# Patient Record
Sex: FEMALE | Race: WHITE | NOT HISPANIC OR LATINO | Employment: FULL TIME | URBAN - METROPOLITAN AREA
[De-identification: names, ages, dates, MRNs, and addresses within clinical notes are randomized per-mention and may not be internally consistent; named-entity substitution may affect disease eponyms.]

---

## 2023-08-02 ENCOUNTER — HOSPITAL ENCOUNTER (OUTPATIENT)
Dept: DATA CONVERSION | Facility: HOSPITAL | Age: 53
End: 2023-08-02
Attending: INTERNAL MEDICINE | Admitting: INTERNAL MEDICINE
Payer: COMMERCIAL

## 2023-08-02 DIAGNOSIS — Z12.11 ENCOUNTER FOR SCREENING FOR MALIGNANT NEOPLASM OF COLON: ICD-10-CM

## 2023-08-02 DIAGNOSIS — K62.89 OTHER SPECIFIED DISEASES OF ANUS AND RECTUM: ICD-10-CM

## 2023-09-29 VITALS — WEIGHT: 146.39 LBS | HEIGHT: 63 IN | BODY MASS INDEX: 25.94 KG/M2

## 2023-09-30 NOTE — H&P
History of Present Illness:   Pregnant/Lactating:  ·  Are You Pregnant no   ·  Are You Currently Breastfeeding no     Admission Reason: screening colonoscopy   HPI:    JIMENA PARKER is a 52 year old Female           Allergies:  ·  sulfa drugs : Unknown    Medications Prior to Admission:   Admission Medication Reconciliation has not been completed for this patient.    Objective:   Physical Exam by System:    Constitutional: Well developed, awake/alert/oriented  x3, no distress, alert and cooperative   Eyes: PERRL, EOMI, clear sclera   ENMT: mucous membranes moist, no apparent injury,  no lesions seen   Head/Neck: Neck supple, no apparent injury, thyroid  without mass or tenderness, No JVD, trachea midline, no bruits   Respiratory/Thorax: Patent airways, CTAB, normal  breath sounds with good chest expansion, thorax symmetric   Cardiovascular: Regular, rate and rhythm, no murmurs,  2+ equal pulses of the extremities, normal S 1and S 2   Gastrointestinal: Nondistended, soft, non-tender,  no rebound tenderness or guarding, no masses palpable, no organomegaly, +BS, no bruits   Genitourinary: No Discharge, vesicles or other abnormalities   Musculoskeletal: ROM intact, no joint swelling, normal  strength   Extremities: normal extremities, no cyanosis edema,  contusions or wounds, no clubbing   Neurological: alert and oriented x3, intact senses,  motor, response and reflexes, normal strength   Skin: Warm and dry, no lesions, no rashes       Electronic Signatures:  Benito Fernandez)  (Signed 02-Aug-2023 08:12)   Authored: History of Present Illness, Comorbidities,  Allergies, Medications Prior to Admission, Objective, Note Completion      Last Updated: 02-Aug-2023 08:12 by Benito Fernandez)

## 2023-11-14 ENCOUNTER — OFFICE VISIT (OUTPATIENT)
Dept: PRIMARY CARE | Facility: CLINIC | Age: 53
End: 2023-11-14
Payer: COMMERCIAL

## 2023-11-14 VITALS
WEIGHT: 151 LBS | SYSTOLIC BLOOD PRESSURE: 130 MMHG | DIASTOLIC BLOOD PRESSURE: 80 MMHG | TEMPERATURE: 97.4 F | BODY MASS INDEX: 26.82 KG/M2

## 2023-11-14 DIAGNOSIS — M79.645 FINGER PAIN, LEFT: Primary | ICD-10-CM

## 2023-11-14 LAB — ERYTHROCYTE [SEDIMENTATION RATE] IN BLOOD BY WESTERGREN METHOD: 4 MM/H (ref 0–30)

## 2023-11-14 PROCEDURE — 86038 ANTINUCLEAR ANTIBODIES: CPT

## 2023-11-14 PROCEDURE — 1036F TOBACCO NON-USER: CPT | Performed by: FAMILY MEDICINE

## 2023-11-14 PROCEDURE — 99213 OFFICE O/P EST LOW 20 MIN: CPT | Performed by: FAMILY MEDICINE

## 2023-11-14 PROCEDURE — 86431 RHEUMATOID FACTOR QUANT: CPT

## 2023-11-14 PROCEDURE — 36415 COLL VENOUS BLD VENIPUNCTURE: CPT

## 2023-11-14 PROCEDURE — 85652 RBC SED RATE AUTOMATED: CPT

## 2023-11-14 RX ORDER — MELOXICAM 15 MG/1
15 TABLET ORAL DAILY
Qty: 30 TABLET | Refills: 0 | Status: SHIPPED | OUTPATIENT
Start: 2023-11-14 | End: 2024-11-13

## 2023-11-14 RX ORDER — TAZAROTENE 0.45 MG/G
LOTION TOPICAL
COMMUNITY
Start: 2021-09-08

## 2023-11-14 RX ORDER — NORETHINDRONE 0.35 MG/1
TABLET ORAL
COMMUNITY

## 2023-11-14 RX ORDER — SPIRONOLACTONE 50 MG/1
50 TABLET, FILM COATED ORAL DAILY
COMMUNITY

## 2023-11-14 ASSESSMENT — PATIENT HEALTH QUESTIONNAIRE - PHQ9
1. LITTLE INTEREST OR PLEASURE IN DOING THINGS: NOT AT ALL
2. FEELING DOWN, DEPRESSED OR HOPELESS: NOT AT ALL
SUM OF ALL RESPONSES TO PHQ9 QUESTIONS 1 AND 2: 0

## 2023-11-14 ASSESSMENT — ENCOUNTER SYMPTOMS: DEPRESSION: 0

## 2023-11-14 NOTE — PROGRESS NOTES
"Subjective   Patient ID: [id number]     Jia Yee is a 53 y.o. female who presents for Hand Pain (Left pinky and left ring finger.).  HPI  She complains of pain in her left fourth and fifth finger.      It has been there for a couple of years.      She types and plays piano and it is getting in the way of her doing these things lately.    She has tried tumeric.      No neck pain.  She has pain in the right cubital tunnel with lifting weights sometimes.  Her finger pain is in the left.      The pain is more in the DIP joints of the left fourth and fifth  finger.    Fam Hx.  her dad had \"trigger finger\".  Her sister has RA.          Objective     /80 (BP Location: Right arm, Patient Position: Sitting)   Temp 36.3 °C (97.4 °F) (Skin)   Wt 68.5 kg (151 lb)   BMI 26.82 kg/m²      Physical Exam  Constitutional:       Appearance: Normal appearance.   Musculoskeletal:      Comments: Tender at the left DIP joints of fourth and fifth fingers.  Hypertrophic changed noted.  Full ROM but tender.        Neurological:      Mental Status: She is alert.         Assessment/Plan   Problem List Items Addressed This Visit    None  Visit Diagnoses       Finger pain, left    -  Primary    Relevant Medications    meloxicam (Mobic) 15 mg tablet    Other Relevant Orders    XR hand left 3+ views    Rheumatoid Factor    JUDIE with Reflex to OUMAR    Sedimentation Rate    Referral to Occupational Therapy        I will order xrays, labs, a medication and occupational therapy.  Return in one or two months if the pain persists.    Zaid Nielsen, DO   "

## 2023-11-14 NOTE — PATIENT INSTRUCTIONS
I will order xrays, labs, a medication and occupational therapy.  Return in one or two months if the pain persists.

## 2023-11-15 ENCOUNTER — ANCILLARY PROCEDURE (OUTPATIENT)
Dept: RADIOLOGY | Facility: CLINIC | Age: 53
End: 2023-11-15
Payer: COMMERCIAL

## 2023-11-15 DIAGNOSIS — M79.645 FINGER PAIN, LEFT: ICD-10-CM

## 2023-11-15 LAB
ANA SER QL HEP2 SUBST: NEGATIVE
RHEUMATOID FACT SER NEPH-ACNC: <10 IU/ML (ref 0–15)

## 2023-11-15 PROCEDURE — 73130 X-RAY EXAM OF HAND: CPT | Mod: LEFT SIDE | Performed by: RADIOLOGY

## 2023-11-15 PROCEDURE — 73130 X-RAY EXAM OF HAND: CPT | Mod: LT

## 2023-12-11 ENCOUNTER — EVALUATION (OUTPATIENT)
Dept: OCCUPATIONAL THERAPY | Facility: CLINIC | Age: 53
End: 2023-12-11
Payer: COMMERCIAL

## 2023-12-11 DIAGNOSIS — M79.645 FINGER PAIN, LEFT: ICD-10-CM

## 2023-12-11 PROCEDURE — 97110 THERAPEUTIC EXERCISES: CPT | Mod: GO

## 2023-12-11 PROCEDURE — 97165 OT EVAL LOW COMPLEX 30 MIN: CPT | Mod: GO

## 2023-12-11 NOTE — PROGRESS NOTES
Occupational Therapy Upper Extremity Evaluation/Discharge Note    12/11/2023  Time in: 1511  Time out:1538  Total Time:27    Name: Jia Yee   YOB: 1970   MRN: [id number]    Referring Physician: Dr. Nielsen    for: left ring and small finger pain  Diagnosis: Left ring and small finger pain   Date of onset:6/3/2021  Precautions: None    Insurance  Visit Number:1  Visits Allowed: 60  Authorization: None required  Date Range: NA    Subjective  Function: Patient states she has pain with playing piano, typing, and carrying weight in her hand.  Pain and Location:)/10 rest, 2/20 playing piano  Chief Complaint: Pain in IPs  Patient's goal for therapy: Pain free when working out and playing the piano  Outcome Measure:  Initial evaluation Quick DASH    Objective  Observation:  Mild edema small and ring DIP  Skin/ Wound/Scar: skin intact    Sensation: No complaint of numbness/tingling  Dexterity/ Coordination: No issues with fine motor, per patient    Upper Extremity ROM   Elbow flex/extension WNL  Forearm supination/pronation WNL  Wrist Extension/Flexion WNL  Radial deviation/ulnar deviation  wWNL    Hand ROM  AROM   THUMB      K[place]andji DPC    Palmar Abduction     Radial Abduction      Finger (DPC)        Index Middle Ring Small    DPC DPC DPC DPC                Hand Strength                Gross grasp(dynamometer) (lbs)                             (2nd setting) Right   55   Left 60                Pinch (lbs)                             Key  right 14  left 12                            Jones   right  16   left 14    Treatment Completed This Date:  Patient instructed in joint protection techniques. She was also instructed in pain management and edema reduction techniques. Patient fitted with compression sleeves for ring and small fingers., instructed in coban wrapping, and use of Rock Tape. Purpose, wearing schedule, precautions, and care discussed. Patient instructed in how to remove rock tape . She also  completed trial paraffin. Patient discharge this date and is expected to do well with education provided to her for pain management, edema reduction, and joint protection.    Assessment  Patient is a 54 y/o right hand dominate female with a history of a couple of years of pain. Patient diagnosed with finger pain left.   Patient reports pain and decreased independence with ADLs/ IADLs. Patient demonstrates mild decreased ability with ADLs/IADLs  per Quick DASH score. Patient will benefit from attending occupational therapy this date for one time visit for in instruction in joint protection and pain management techniques and discharged.     Plan of Care  Goals to be achieved this date      Patient will report understanding of home program and joint protection and demonstrate independence and verbalize precautions.    Intervention    Trial paraffin and patient education    Plan  Discharge this date. Patient instructed to contact therapist with any questions.    Patient and therapist developed goals for therapy and patient verbalizing agreement to plan of care

## 2024-01-24 ENCOUNTER — TELEPHONE (OUTPATIENT)
Dept: PRIMARY CARE | Facility: CLINIC | Age: 54
End: 2024-01-24
Payer: COMMERCIAL

## 2024-01-24 DIAGNOSIS — M19.049 OSTEOARTHRITIS OF FINGER, UNSPECIFIED LATERALITY: Primary | ICD-10-CM

## 2024-01-24 NOTE — TELEPHONE ENCOUNTER
Pt is requesting a referral for a hand specialist. She went to OT and they told her that she has arthritis and they would not be able to help her. She has already completed the x-ray, as well. Please let me know when this is ordered so I can notify pt

## 2024-01-30 ENCOUNTER — OFFICE VISIT (OUTPATIENT)
Dept: ORTHOPEDIC SURGERY | Facility: CLINIC | Age: 54
End: 2024-01-30
Payer: COMMERCIAL

## 2024-01-30 DIAGNOSIS — M19.049 OSTEOARTHRITIS OF FINGER, UNSPECIFIED LATERALITY: ICD-10-CM

## 2024-01-30 PROCEDURE — 1036F TOBACCO NON-USER: CPT | Performed by: ORTHOPAEDIC SURGERY

## 2024-01-30 PROCEDURE — 2500000004 HC RX 250 GENERAL PHARMACY W/ HCPCS (ALT 636 FOR OP/ED): Performed by: ORTHOPAEDIC SURGERY

## 2024-01-30 PROCEDURE — 20600 DRAIN/INJ JOINT/BURSA W/O US: CPT | Mod: LT | Performed by: ORTHOPAEDIC SURGERY

## 2024-01-30 PROCEDURE — 2500000005 HC RX 250 GENERAL PHARMACY W/O HCPCS: Performed by: ORTHOPAEDIC SURGERY

## 2024-01-30 PROCEDURE — 99214 OFFICE O/P EST MOD 30 MIN: CPT | Performed by: ORTHOPAEDIC SURGERY

## 2024-01-30 RX ORDER — LIDOCAINE HYDROCHLORIDE 10 MG/ML
0.5 INJECTION INFILTRATION; PERINEURAL
Status: COMPLETED | OUTPATIENT
Start: 2024-01-30 | End: 2024-01-30

## 2024-01-30 RX ADMIN — LIDOCAINE HYDROCHLORIDE 0.5 ML: 10 INJECTION, SOLUTION INFILTRATION; PERINEURAL at 08:44

## 2024-01-30 RX ADMIN — TRIAMCINOLONE ACETONIDE 5 MG: 10 INJECTION, SUSPENSION INTRA-ARTICULAR; INTRALESIONAL at 08:44

## 2024-01-30 RX ADMIN — TRIAMCINOLONE ACETONIDE 5 MG: 10 INJECTION, SUSPENSION INTRA-ARTICULAR; INTRALESIONAL at 08:45

## 2024-01-30 RX ADMIN — LIDOCAINE HYDROCHLORIDE 0.5 ML: 10 INJECTION, SOLUTION INFILTRATION; PERINEURAL at 08:45

## 2024-01-30 NOTE — PROGRESS NOTES
1/30/2024    Chief Complaint   Patient presents with    Left Hand - Pain, New Patient Visit     Ring and small finger arthritis  Xrays @  11/14/23       History of Present Illness:  Patient Jia Yee , 53 y.o. female, presents today, 1/30/2024, for evaluation of left ring finger and little finger pain and swelling.  Jia comes in today for evaluation of left ring and small finger pain and swelling localized over the distal interphalangeal joints.  She states has been a chronic issue for several months.  Is been steadily worsening.  She denies any discrete injury or trauma.  She feels that the activities are exacerbated by typing all day and playing piano.  She is right-hand dominant individual.  She saw her PCP for this and had a consult with occupational therapy but they recommended follow-up with a hand surgeon for further evaluation.  Of note patient has family history of rheumatoid arthritis with her sister being positive, however recent blood work from PCP showed negative ESR, negative JUDIE, negative rheumatoid factor.       Review of Systems:   GENERAL: Negative  GI: Negative  MUSCULOSKELETAL: See HPI  SKIN: Negative  NEURO:  Negative     Physical Exam:  GENERAL:  Alert and oriented to person, place, and time.  No acute distress and breathing comfortably; pleasant and cooperative with the examination.  HEENT:  Head is normocephalic and atraumatic.  NECK:  Supple, no visible swelling.  CARDIOVASCULAR:  No palpable tachycardia.  LUNGS:  No audible wheezing or labored breathing.  ABDOMEN:  Nondistended.  Extremities: Evaluation of left upper extremity finds the patient to have a palpable radial artery at the wrist with brisk capillary refill to all digits. The patient has intact sensorium to axillary, radial, median and ulnar nerves. There are no open wounds. There are no signs of infection. There is no evidence of lymphedema or lymphatic streaking. The patient has supple compartments of the left  arm, forearm and hand.  Mild tenderness palpation and swelling noted over the left ring finger and left small finger distal interphalangeal joints respectively.  Patient does have full composite fist on exam no extensor lag or rotational deformity noted.     Imaging:  3 views of the left hand in the  PACS system show no acute fracture or dislocation.  There is arthritic change noted with loss of joint space height, narrowing, osteophyte formation to the left ring finger and small finger distal interphalangeal joints.     Assessment:  Left hand arthritis affecting left ring finger and left small finger distal interphalangeal joints.     Plan:  Operative and nonoperative treatment strategies were reviewed.  Recommendations were made for initial nonoperative management in favor of Kenalog injection into left ring finger and left small finger distal interphalangeal joints.  These were performed off today by Dr. Last and tolerated by patient.  Continue with activities to tolerance.  We did discuss the option of intermittent taping and bracing of the fingers as needed for comfort as well as oral anti-inflammatories as needed for pain control.  Follow-up with our office in 6 weeks for repeat clinical exam no radiographs necessary upon return.  All questions answered today's visit.        Hand / UE Inj/Asp: L ring DIP for osteoarthritis on 1/30/2024 8:44 AM  Indications: pain  Details: 25 G needle, dorsal approach  Medications: 5 mg triamcinolone acetonide 10 mg/mL; 0.5 mL lidocaine 10 mg/mL (1 %)  Outcome: tolerated well, no immediate complications  Procedure, treatment alternatives, risks and benefits explained, specific risks discussed. Consent was given by the patient. Immediately prior to procedure a time out was called to verify the correct patient, procedure, equipment, support staff and site/side marked as required. Patient was prepped and draped in the usual sterile fashion.       Hand / UE Inj/Asp: L small  DIP for osteoarthritis on 1/30/2024 8:45 AM  Indications: pain  Details: 25 G needle, dorsal approach  Medications: 5 mg triamcinolone acetonide 10 mg/mL; 0.5 mL lidocaine 10 mg/mL (1 %)  Outcome: tolerated well, no immediate complications  Procedure, treatment alternatives, risks and benefits explained, specific risks discussed. Consent was given by the patient. Immediately prior to procedure a time out was called to verify the correct patient, procedure, equipment, support staff and site/side marked as required. Patient was prepped and draped in the usual sterile fashion.       In a face to face encounter, I performed a history and physical examination, discussed pertinent diagnostic studies if indicated, and discussed diagnosis and management strategies with both the patient and the mid-level provider. I reviewed the mid-level's note and agree with the documented findings and plan of care.      Patient presents today with multiple complaints but primarily wants to focus on symptomatic pain to distal interphalangeal joints of left ring and small fingers.  Exam shows tenderness and swelling to the joints on today's exam.  Treatment options were discussed.  Patient elects for intra-articular steroid injection to the distal interphalangeal joints.  We did talk about operative techniques to include fusion if it came to that.  Plan for 6-week follow-up.  No x-rays necessary upon return.

## 2024-03-14 ENCOUNTER — OFFICE VISIT (OUTPATIENT)
Dept: ORTHOPEDIC SURGERY | Facility: CLINIC | Age: 54
End: 2024-03-14
Payer: COMMERCIAL

## 2024-03-14 DIAGNOSIS — M19.049 OSTEOARTHRITIS OF FINGER, UNSPECIFIED LATERALITY: Primary | ICD-10-CM

## 2024-03-14 PROCEDURE — 1036F TOBACCO NON-USER: CPT | Performed by: ORTHOPAEDIC SURGERY

## 2024-03-14 PROCEDURE — 99213 OFFICE O/P EST LOW 20 MIN: CPT | Performed by: ORTHOPAEDIC SURGERY

## 2024-03-14 RX ORDER — BUSPIRONE HYDROCHLORIDE 10 MG/1
10 TABLET ORAL 3 TIMES DAILY
COMMUNITY
Start: 2017-01-23

## 2024-03-14 ASSESSMENT — PAIN - FUNCTIONAL ASSESSMENT: PAIN_FUNCTIONAL_ASSESSMENT: 0-10

## 2024-03-14 ASSESSMENT — PAIN SCALES - GENERAL: PAINLEVEL_OUTOF10: 2

## 2024-03-14 NOTE — PROGRESS NOTES
3/14/2024    Chief Complaint   Patient presents with    Left Hand - Follow-up     Lt hand arthritis affecting Lt ring and small DIP joint s/p inj 1/30/24       History of Present Illness:  Patient Jia Yee , 53 y.o. female, presents today, 3/14/2024, for evaluation of left ring finger and little finger pain and swelling secondary to DIP arthritis.  She underwent injections about 6 to 7 weeks ago and has had good symptomatic relief.  She feels that swelling is decreased, motion is improved, overall pain is much more well-controlled.  Functionally with work activities as such as typing and then playing the piano at home she is not having pain or discomfort.       Review of Systems:   GENERAL: Negative  GI: Negative  MUSCULOSKELETAL: See HPI  SKIN: Negative  NEURO:  Negative     Physical Exam:  GENERAL:  Alert and oriented to person, place, and time.  No acute distress and breathing comfortably; pleasant and cooperative with the examination.  HEENT:  Head is normocephalic and atraumatic.  NECK:  Supple, no visible swelling.  CARDIOVASCULAR:  No palpable tachycardia.  LUNGS:  No audible wheezing or labored breathing.  ABDOMEN:  Nondistended.  Extremities: Evaluation of left upper extremity finds the patient to have a palpable radial artery at the wrist with brisk capillary refill to all digits. The patient has intact sensorium to axillary, radial, median and ulnar nerves. There are no open wounds. There are no signs of infection. There is no evidence of lymphedema or lymphatic streaking. The patient has supple compartments of the left arm, forearm and hand.  Very mild tenderness palpation over the distal interphalangeal joints to left small and ring finger.  This is substantially improved from previous exam.  She is full composite fist no evidence of extensor lag.     Imaging/Test Results:  None today.     Assessment:  Distal interphalangeal joint arthritis affecting left small and ring fingers, responding  well to nonoperative treatment strategies.     Plan:  Operative and nonoperative treatment strategies were reviewed, patient has strong preference for continued nonoperative management.  Patient can continue with weightbearing and activities as tolerated.  Follow-up with our office in as-needed basis.  We did discuss possibility of repeat injection in the future.  Patient verbalized agreement, understanding, plan for care.  All questions answered at today's visit.      In a face to face encounter, I performed a history and physical examination, discussed pertinent diagnostic studies if indicated, and discussed diagnosis and management strategies with both the patient and the mid-level provider. I reviewed the mid-level's note and agree with the documented findings and plan of care.  Patient presents today for arthritic change affecting the left ring and small finger distal interphalangeal joints.  Steroid injections really helped.  He is not perfect but it is clearly improved.  On today's exam there is minimal tenderness.  Treatment options were discussed.  She understands that likely pain will return over time.  We talked about what can be done surgically if injections fail.  Ultimately plan for follow-up on an as-needed basis and likely would implement steroid injection again given the benefit of the recent injections.

## 2024-06-11 ENCOUNTER — APPOINTMENT (OUTPATIENT)
Dept: ORTHOPEDIC SURGERY | Facility: CLINIC | Age: 54
End: 2024-06-11
Payer: COMMERCIAL

## 2024-07-09 ENCOUNTER — OFFICE VISIT (OUTPATIENT)
Dept: ORTHOPEDIC SURGERY | Facility: CLINIC | Age: 54
End: 2024-07-09
Payer: COMMERCIAL

## 2024-07-09 DIAGNOSIS — M19.049 OSTEOARTHRITIS OF FINGER, UNSPECIFIED LATERALITY: Primary | ICD-10-CM

## 2024-07-09 PROCEDURE — 2500000005 HC RX 250 GENERAL PHARMACY W/O HCPCS: Performed by: ORTHOPAEDIC SURGERY

## 2024-07-09 PROCEDURE — 99214 OFFICE O/P EST MOD 30 MIN: CPT | Performed by: ORTHOPAEDIC SURGERY

## 2024-07-09 PROCEDURE — 1036F TOBACCO NON-USER: CPT | Performed by: ORTHOPAEDIC SURGERY

## 2024-07-09 PROCEDURE — 20600 DRAIN/INJ JOINT/BURSA W/O US: CPT | Mod: LT | Performed by: ORTHOPAEDIC SURGERY

## 2024-07-09 PROCEDURE — 2500000004 HC RX 250 GENERAL PHARMACY W/ HCPCS (ALT 636 FOR OP/ED): Performed by: ORTHOPAEDIC SURGERY

## 2024-07-09 RX ORDER — LIDOCAINE HYDROCHLORIDE 10 MG/ML
0.5 INJECTION INFILTRATION; PERINEURAL
Status: COMPLETED | OUTPATIENT
Start: 2024-07-09 | End: 2024-07-09

## 2024-07-09 NOTE — PROGRESS NOTES
7/9/2024    Chief Complaint   Patient presents with    Left Hand - Osteoarthritis     Lt ring and small DIP arthritis s/p injs 1/30/24       History of Present Illness:  Patient Jia Yee , 53 y.o. female, presents today, 7/9/2024, for evaluation of left little finger pain and swelling.  Symptoms are localized to the distal interphalangeal joint.  She underwent injection of this painful joint in January of this year and got great symptomatic relief for about 5 months.  Symptoms of pain and swelling to the small finger localized to the DIP are starting return.  She denies any new injury or trauma.  She is requesting repeat injection today.        Review of Systems:   GENERAL: Negative  GI: Negative  MUSCULOSKELETAL: See HPI  SKIN: Negative  NEURO:  Negative     Physical Exam:  GENERAL:  Alert and oriented to person, place, and time.  No acute distress and breathing comfortably; pleasant and cooperative with the examination.  HEENT:  Head is normocephalic and atraumatic.  NECK:  Supple, no visible swelling.  CARDIOVASCULAR:  No palpable tachycardia.  LUNGS:  No audible wheezing or labored breathing.  ABDOMEN:  Nondistended.  Extremities: Evaluation of left upper extremity finds the patient to have a palpable radial artery at the wrist with brisk capillary refill to all digits. The patient has intact sensorium to axillary, radial, median and ulnar nerves. There are no open wounds. There are no signs of infection. There is no evidence of lymphedema or lymphatic streaking. The patient has supple compartments of the left arm, forearm and hand.  Tenderness palpation over the distal interphalangeal joint of the left small finger with mild swelling is noted.  She does have full composite fist on exam.  No extensor lag.     Imaging/Test Results:  None today.  Previous radiographs showed arthritic change at the distal interphalangeal joint of the left small finger.     Assessment:  Left small finger distal  interphalangeal joint arthritis.     Plan:  Treatment strategies were reviewed including operative and nonoperative strategies.  Recommendations were made for continued nonoperative management with Kenalog injection of left small finger distal interphalangeal joint.  This is also patient preference.  This was performed in office today by Dr. Last and tolerated well.  Continue with activities as tolerated and follow-up with our office in as-needed basis if symptoms dictate, but we do anticipate return in the future.  All questions answered at today's visit.    Hand / UE Inj/Asp: L small DIP for osteoarthritis on 7/9/2024 3:47 PM  Indications: pain  Details: 25 G needle, dorsal approach  Medications: 5 mg triamcinolone acetonide 10 mg/mL; 0.5 mL lidocaine 10 mg/mL (1 %)  Outcome: tolerated well, no immediate complications  Procedure, treatment alternatives, risks and benefits explained, specific risks discussed. Consent was given by the patient. Immediately prior to procedure a time out was called to verify the correct patient, procedure, equipment, support staff and site/side marked as required. Patient was prepped and draped in the usual sterile fashion.         In a face to face encounter, I performed a history and physical examination, discussed pertinent diagnostic studies if indicated, and discussed diagnosis and management strategies with both the patient and the mid-level provider. I reviewed the mid-level's note and agree with the documented findings and plan of care.  Patient presents today for evaluation of left small finger distal ulna phalangeal joint pain.  Focal tenderness and deformity.  Treatment options were discussed.  We talked about operative and nonoperative strategies.  We talked about the option of fusion along with intraoperative techniques for the fusion.  She elects for repeat steroid injection and follow-up when symptoms dictate.

## 2024-11-19 ENCOUNTER — OFFICE VISIT (OUTPATIENT)
Dept: ORTHOPEDIC SURGERY | Facility: CLINIC | Age: 54
End: 2024-11-19
Payer: COMMERCIAL

## 2024-11-19 DIAGNOSIS — M19.049 HAND ARTHRITIS: Primary | ICD-10-CM

## 2024-11-19 PROCEDURE — 99214 OFFICE O/P EST MOD 30 MIN: CPT | Mod: 25 | Performed by: ORTHOPAEDIC SURGERY

## 2024-11-19 PROCEDURE — 2500000004 HC RX 250 GENERAL PHARMACY W/ HCPCS (ALT 636 FOR OP/ED): Performed by: ORTHOPAEDIC SURGERY

## 2024-11-19 PROCEDURE — 20600 DRAIN/INJ JOINT/BURSA W/O US: CPT | Mod: RT | Performed by: ORTHOPAEDIC SURGERY

## 2024-11-19 PROCEDURE — 99214 OFFICE O/P EST MOD 30 MIN: CPT | Performed by: ORTHOPAEDIC SURGERY

## 2024-11-19 PROCEDURE — 20600 DRAIN/INJ JOINT/BURSA W/O US: CPT | Mod: 59,RT | Performed by: ORTHOPAEDIC SURGERY

## 2024-11-19 RX ORDER — LIDOCAINE HYDROCHLORIDE 10 MG/ML
0.5 INJECTION, SOLUTION INFILTRATION; PERINEURAL
Status: COMPLETED | OUTPATIENT
Start: 2024-11-19 | End: 2024-11-19

## 2024-11-19 NOTE — PROGRESS NOTES
History present illness: Patient presents today for evaluation of painful arthritis affecting left small finger and ring finger at the distal interphalangeal joint.  Pain and dysfunction on a daily basis.      Past medical history: The patient's past medical history, family history, social history, and review of systems were documented on the patient medical intake.  The updated data was reviewed in the electronic medical record.  History is negative except otherwise stated in history of present illness.        Physical examination:  General: Alert and oriented to person, place, and time.  No acute distress and breathing comfortably: Pleasant and cooperative with examination.  HEENT: Head is normocephalic and atraumatic.  Neck: Supple, no visible swelling.  Cardiovascular: No palpable tachycardia  Lungs: No audible wheezing or labored breathing  Abdomen: Nondistended.  Extremities: Evaluation of the left upper extremity finds the patient had palpable radial artery at the wrist with brisk capillary refill to all digits.  Patient has intact sensation to axillary radial median and ulnar nerves.  There are no open wounds.  There are no signs of infection.  There is no evidence of lymphedema or lymphatic streaking.  The patient has supple compartments to left arm forearm and hand.  Focal tenderness swelling and deformity to distal interphalangeal joints of left ring and small fingers.      Radiology:      Assessment: Left ring and small finger distal interphalangeal joint arthritic change      Plan: Treatment options were discussed.  We talked about operative and nonoperative strategies.  We talked about intraoperative techniques for fusion.  She elects for steroid injection to the symptomatic painful joints today and for follow-up with me when symptoms of pain return.        Procedure:  Hand / UE Inj/Asp: R ring DIP for osteoarthritis on 11/19/2024 8:24 AM  Indications: pain  Details: 25 G needle, dorsal  approach  Medications: 5 mg triamcinolone acetonide 10 mg/mL; 0.5 mL lidocaine 10 mg/mL (1 %)  Outcome: tolerated well, no immediate complications  Procedure, treatment alternatives, risks and benefits explained, specific risks discussed. Consent was given by the patient. Immediately prior to procedure a time out was called to verify the correct patient, procedure, equipment, support staff and site/side marked as required. Patient was prepped and draped in the usual sterile fashion.       Hand / UE Inj/Asp: R small DIP for osteoarthritis on 11/19/2024 8:24 AM  Indications: pain  Details: 25 G needle, dorsal approach  Medications: 5 mg triamcinolone acetonide 10 mg/mL; 0.5 mL lidocaine 10 mg/mL (1 %)  Outcome: tolerated well, no immediate complications  Procedure, treatment alternatives, risks and benefits explained, specific risks discussed. Consent was given by the patient. Immediately prior to procedure a time out was called to verify the correct patient, procedure, equipment, support staff and site/side marked as required. Patient was prepped and draped in the usual sterile fashion.

## 2025-02-06 ENCOUNTER — APPOINTMENT (OUTPATIENT)
Dept: PRIMARY CARE | Facility: CLINIC | Age: 55
End: 2025-02-06
Payer: COMMERCIAL

## 2025-02-06 VITALS
SYSTOLIC BLOOD PRESSURE: 134 MMHG | TEMPERATURE: 97.7 F | BODY MASS INDEX: 25.76 KG/M2 | WEIGHT: 145 LBS | DIASTOLIC BLOOD PRESSURE: 94 MMHG

## 2025-02-06 DIAGNOSIS — Z63.4 BEREAVEMENT DUE TO LIFE EVENT: ICD-10-CM

## 2025-02-06 DIAGNOSIS — J04.0 LARYNGITIS: Primary | ICD-10-CM

## 2025-02-06 PROCEDURE — 99214 OFFICE O/P EST MOD 30 MIN: CPT | Performed by: FAMILY MEDICINE

## 2025-02-06 PROCEDURE — 1036F TOBACCO NON-USER: CPT | Performed by: FAMILY MEDICINE

## 2025-02-06 RX ORDER — OMEPRAZOLE 40 MG/1
40 CAPSULE, DELAYED RELEASE ORAL
Qty: 30 CAPSULE | Refills: 0 | Status: SHIPPED | OUTPATIENT
Start: 2025-02-06 | End: 2025-03-08

## 2025-02-06 RX ORDER — ALPRAZOLAM 0.25 MG/1
0.25 TABLET ORAL 3 TIMES DAILY PRN
Qty: 7 TABLET | Refills: 0 | Status: SHIPPED | OUTPATIENT
Start: 2025-02-06 | End: 2025-10-04

## 2025-02-06 SDOH — SOCIAL STABILITY - SOCIAL INSECURITY: DISSAPEARANCE AND DEATH OF FAMILY MEMBER: Z63.4

## 2025-02-06 NOTE — PROGRESS NOTES
"Subjective   Patient ID: [id number]     Jia Yee is a 54 y.o. female who presents for throat irritation (Feels like a pill is stuck in throat X 1 month.).  HPI    She states it feels like there is a pill stuck in her throat for a month.      She points to the thyroid.  She states she sometimes feel like she has heartburn.     She has bad anxiety.  Her father is actively dying in hospice right now.     No sore throat.  No fevers.  No ACEI.      It is not constant.  It comes and goes so she does not think it is actually a pill in there.     She quit smoking 25 years ago.  No oral tobacco.      She drinks alcohol \"probably more than I should right now\" but usually just a social drinker if she goes out on a weekend.     Mother passed from esophageal cancer.    Objective     BP (!) 134/94 (BP Location: Right arm, Patient Position: Sitting)   Temp 36.5 °C (97.7 °F) (Skin)   Wt 65.8 kg (145 lb)   BMI 25.76 kg/m²      Physical Exam  Constitutional:       Appearance: Normal appearance.   HENT:      Mouth/Throat:      Pharynx: Oropharynx is clear. No oropharyngeal exudate or posterior oropharyngeal erythema.   Neck:      Comments: Points to thyroid cartilage region as the site of discomfort.   Musculoskeletal:      Cervical back: Normal range of motion and neck supple. No tenderness.   Lymphadenopathy:      Cervical: No cervical adenopathy.   Neurological:      Mental Status: She is alert.   Psychiatric:      Comments: Grieving.  Father in hospice and dying.          Assessment/Plan   Problem List Items Addressed This Visit    None  Visit Diagnoses       Laryngitis    -  Primary    Relevant Medications    omeprazole (PriLOSEC) 40 mg DR capsule    Bereavement due to life event        Relevant Medications    ALPRAZolam (Xanax) 0.25 mg tablet        I will order an antacid and an ENT referral for laryngoscopy.  I recommend avoiding alcohol.  I ordered a short term course of anxiety medication as you deal with grief from " your father's illness in hospice care.  Return in one or two months if this persist.    Zaid Nielsen, DO

## 2025-02-06 NOTE — PATIENT INSTRUCTIONS
I will order an antacid and an ENT referral for laryngoscopy.  I recommend avoiding alcohol.  I ordered a short term course of anxiety medication as you deal with grief from your father's illness in hospice care.  Return in one or two months if this persist.

## 2025-02-28 ENCOUNTER — APPOINTMENT (OUTPATIENT)
Dept: OTOLARYNGOLOGY | Facility: CLINIC | Age: 55
End: 2025-02-28
Payer: COMMERCIAL

## 2025-02-28 DIAGNOSIS — Z80.8 FAMILY HISTORY OF THYROID CANCER: ICD-10-CM

## 2025-02-28 DIAGNOSIS — R09.A2 GLOBUS SENSATION: Primary | ICD-10-CM

## 2025-02-28 PROCEDURE — 31575 DIAGNOSTIC LARYNGOSCOPY: CPT | Performed by: OTOLARYNGOLOGY

## 2025-02-28 PROCEDURE — 99203 OFFICE O/P NEW LOW 30 MIN: CPT | Performed by: OTOLARYNGOLOGY

## 2025-02-28 NOTE — PROGRESS NOTES
Subjective   Patient ID: Jia Yee is a 54 y.o. female who presents for GLOBUS SENSATION.    HPI  New patient being seen for globus sensation for 2 months. Reports feeling that something is always stuck in throat, unable to clear it. Denies difficulty swallowing, feeling that foods get stuck when eating or drinking. Saw PCP, who prescribed Omeprazole which patient has been taking for approximately 3 weeks without relief.       Of note, mother did pass of esophageal cancer, though patient has had esophageal scope since then which showed nothing concerning. Sister with history of thyroid CA.    All remaining head neck inquiry otherwise negative.     Review of Systems   Constitutional: Negative.    HENT: Negative.     Respiratory: Negative.     Cardiovascular: Negative.    Neurological: Negative.      Physical Exam  General appearance: No acute distress. Normal facies. Symmetric facial movement. No gross lesions of the face are noted.  Ears:  The external ear structures appear normal. The ear canals patent and the tympanic membranes are intact without evidence of air-fluid levels, retraction, or congenital defects.    Nose:  Anterior rhinoscopy notes essentially a midline nasal septum. Examination is noted for normal healthy mucosal membranes without any evidence of lesions, polyps, or exudate.   Throat/Oral mucosa:  The tongue is normally mobile. There are no lesions on the gingiva, buccal, or oral mucosa. There are no oral cavity masses.  Neck:  The neck is negative for mass lymphadenopathy. The trachea and parotid are clear. The thyroid bed is grossly unremarkable. The salivary gland structures are grossly unremarkable.    Flexible Laryngoscope Procedure: In order to assess the larynx, flexible laryngoscopy was performed based on the patient's history. After topical anesthesia was applied, a very complete flexible laryngoscopy was performed. Nasopharynx is clear without evidence of mass or lesion. This  examination reveals a normal appearance to the laryngeal structures including the true cords, false cords, epiglottis, base of tongue, and piriform sinus, except as noted.    Assessment/Plan   Globus sensation  Family history of thyroid malignancy.    Nothing concerning on physical examination and flexible laryngoscopy today. Suspect musculoskeletal cause of globus sensation. Reassurance provided to patient. Will order Thyroid US for peace of mind, particularly given family history of Thyroid CA. Will call patient to discuss results once that is completed.

## 2025-03-04 ENCOUNTER — HOSPITAL ENCOUNTER (OUTPATIENT)
Dept: RADIOLOGY | Facility: CLINIC | Age: 55
Discharge: HOME | End: 2025-03-04
Payer: COMMERCIAL

## 2025-03-04 DIAGNOSIS — Z80.8 FAMILY HISTORY OF THYROID CANCER: ICD-10-CM

## 2025-03-04 DIAGNOSIS — R09.A2 GLOBUS SENSATION: ICD-10-CM

## 2025-03-04 PROCEDURE — 76536 US EXAM OF HEAD AND NECK: CPT

## 2025-03-04 PROCEDURE — 76536 US EXAM OF HEAD AND NECK: CPT | Performed by: RADIOLOGY

## 2025-04-01 ENCOUNTER — APPOINTMENT (OUTPATIENT)
Dept: ORTHOPEDIC SURGERY | Facility: CLINIC | Age: 55
End: 2025-04-01
Payer: COMMERCIAL

## 2025-04-02 ENCOUNTER — OFFICE VISIT (OUTPATIENT)
Dept: OTOLARYNGOLOGY | Facility: CLINIC | Age: 55
End: 2025-04-02
Payer: COMMERCIAL

## 2025-04-02 DIAGNOSIS — Z80.8 FAMILY HISTORY OF THYROID CANCER: ICD-10-CM

## 2025-04-02 DIAGNOSIS — R09.A2 GLOBUS SENSATION: Primary | ICD-10-CM

## 2025-04-02 PROCEDURE — 99213 OFFICE O/P EST LOW 20 MIN: CPT | Performed by: OTOLARYNGOLOGY

## 2025-04-02 NOTE — PROGRESS NOTES
Subjective   Patient ID: Jia Yee is a 54 y.o. female who presents for IVE THYROID US.    HPI  Patient returns, being seen for follow-up on thyroid US. Patient was previously seen for globus sensation for several months, which I suspected to be musculoskeletal in nature given normal physical examination and flexible laryngoscope. Patient does have family history of thyroid malignancy. Thyroid US completed 3/4/25 showed no concerning nodules or other abnormalities.     All remaining head neck inquiry otherwise negative.     Review of Systems   Constitutional: Negative.    HENT: Negative.     Respiratory: Negative.     Cardiovascular: Negative.    Neurological: Negative.        Physical Exam  General appearance: No acute distress. Normal facies. Symmetric facial movement. No gross lesions of the face are noted.  Ears:  The external ear structures appear normal. The ear canals patent and the tympanic membranes are intact without evidence of air-fluid levels, retraction, or congenital defects.    Nose:  Anterior rhinoscopy notes essentially a midline nasal septum. Examination is noted for normal healthy mucosal membranes without any evidence of lesions, polyps, or exudate.   Throat/Oral mucosa:  The tongue is normally mobile. There are no lesions on the gingiva, buccal, or oral mucosa. There are no oral cavity masses.  Neck:  The neck is negative for mass lymphadenopathy. The trachea and parotid are clear. The thyroid bed is grossly unremarkable. The salivary gland structures are grossly unremarkable.    Assessment/Plan   Globus sensation. Patient to follow-up with GI particularly given family history of esophageal cancer (mother).  Family history of thyroid malignancy. Thyroid US shows nothing concerning at this time. Recommend follow-up with me as needed.

## 2025-04-03 ENCOUNTER — TELEPHONE (OUTPATIENT)
Dept: PRIMARY CARE | Facility: CLINIC | Age: 55
End: 2025-04-03
Payer: COMMERCIAL

## 2025-04-03 DIAGNOSIS — Z80.0 FAMILY HISTORY OF ESOPHAGEAL CANCER: ICD-10-CM

## 2025-04-03 DIAGNOSIS — R09.A2 GLOBUS SENSATION: Primary | ICD-10-CM

## 2025-04-03 NOTE — TELEPHONE ENCOUNTER
Pt is calling stating that she saw an ENT yesterday and they recommended that she do an Endoscopy due to her family history of esophageal cancer. Pt is asking if you can place the order for her so she can call to schedule the appointment with Dr. Fernandez's office? Pt states that she saw Dr. Fernandez for her colonoscopy and would like to see him again for her endoscopy.

## 2025-04-09 ENCOUNTER — HOSPITAL ENCOUNTER (OUTPATIENT)
Dept: OPERATING ROOM | Facility: CLINIC | Age: 55
Discharge: HOME | End: 2025-04-09
Payer: COMMERCIAL

## 2025-04-09 ENCOUNTER — ANESTHESIA EVENT (OUTPATIENT)
Dept: OPERATING ROOM | Facility: CLINIC | Age: 55
End: 2025-04-09
Payer: COMMERCIAL

## 2025-04-09 ENCOUNTER — ANESTHESIA (OUTPATIENT)
Dept: OPERATING ROOM | Facility: CLINIC | Age: 55
End: 2025-04-09
Payer: COMMERCIAL

## 2025-04-09 VITALS
DIASTOLIC BLOOD PRESSURE: 64 MMHG | HEIGHT: 63 IN | WEIGHT: 154.32 LBS | HEART RATE: 75 BPM | OXYGEN SATURATION: 99 % | BODY MASS INDEX: 27.34 KG/M2 | TEMPERATURE: 96.8 F | RESPIRATION RATE: 16 BRPM | SYSTOLIC BLOOD PRESSURE: 119 MMHG

## 2025-04-09 DIAGNOSIS — Z80.0 FAMILY HISTORY OF ESOPHAGEAL CANCER: ICD-10-CM

## 2025-04-09 DIAGNOSIS — R09.A2 GLOBUS SENSATION: ICD-10-CM

## 2025-04-09 PROCEDURE — 3600000007 HC OR TIME - EACH INCREMENTAL 1 MINUTE - PROCEDURE LEVEL TWO: Performed by: ANESTHESIOLOGY

## 2025-04-09 PROCEDURE — A43235 PR ESOPHAGOGASTRODUODENOSCOPY TRANSORAL DIAGNOSTIC: Performed by: ANESTHESIOLOGY

## 2025-04-09 PROCEDURE — 7100000009 HC PHASE TWO TIME - INITIAL BASE CHARGE: Performed by: ANESTHESIOLOGY

## 2025-04-09 PROCEDURE — 3700000001 HC GENERAL ANESTHESIA TIME - INITIAL BASE CHARGE: Performed by: ANESTHESIOLOGY

## 2025-04-09 PROCEDURE — 3600000002 HC OR TIME - INITIAL BASE CHARGE - PROCEDURE LEVEL TWO: Performed by: ANESTHESIOLOGY

## 2025-04-09 PROCEDURE — 7100000010 HC PHASE TWO TIME - EACH INCREMENTAL 1 MINUTE: Performed by: ANESTHESIOLOGY

## 2025-04-09 PROCEDURE — 3700000002 HC GENERAL ANESTHESIA TIME - EACH INCREMENTAL 1 MINUTE: Performed by: ANESTHESIOLOGY

## 2025-04-09 PROCEDURE — 2500000004 HC RX 250 GENERAL PHARMACY W/ HCPCS (ALT 636 FOR OP/ED): Performed by: NURSE ANESTHETIST, CERTIFIED REGISTERED

## 2025-04-09 PROCEDURE — A43235 PR ESOPHAGOGASTRODUODENOSCOPY TRANSORAL DIAGNOSTIC: Performed by: NURSE ANESTHETIST, CERTIFIED REGISTERED

## 2025-04-09 PROCEDURE — 43235 EGD DIAGNOSTIC BRUSH WASH: CPT | Performed by: INTERNAL MEDICINE

## 2025-04-09 RX ORDER — SODIUM CHLORIDE, SODIUM LACTATE, POTASSIUM CHLORIDE, CALCIUM CHLORIDE 600; 310; 30; 20 MG/100ML; MG/100ML; MG/100ML; MG/100ML
INJECTION, SOLUTION INTRAVENOUS CONTINUOUS PRN
Status: DISCONTINUED | OUTPATIENT
Start: 2025-04-09 | End: 2025-04-09

## 2025-04-09 RX ORDER — PROPOFOL 10 MG/ML
INJECTION, EMULSION INTRAVENOUS AS NEEDED
Status: DISCONTINUED | OUTPATIENT
Start: 2025-04-09 | End: 2025-04-09

## 2025-04-09 RX ORDER — GLYCOPYRROLATE 0.2 MG/ML
INJECTION INTRAMUSCULAR; INTRAVENOUS AS NEEDED
Status: DISCONTINUED | OUTPATIENT
Start: 2025-04-09 | End: 2025-04-09

## 2025-04-09 RX ORDER — ONDANSETRON HYDROCHLORIDE 2 MG/ML
4 INJECTION, SOLUTION INTRAVENOUS ONCE AS NEEDED
Status: DISCONTINUED | OUTPATIENT
Start: 2025-04-09 | End: 2025-04-10 | Stop reason: HOSPADM

## 2025-04-09 RX ORDER — LIDOCAINE HYDROCHLORIDE 20 MG/ML
INJECTION, SOLUTION EPIDURAL; INFILTRATION; INTRACAUDAL; PERINEURAL AS NEEDED
Status: DISCONTINUED | OUTPATIENT
Start: 2025-04-09 | End: 2025-04-09

## 2025-04-09 RX ORDER — SODIUM CHLORIDE, SODIUM LACTATE, POTASSIUM CHLORIDE, CALCIUM CHLORIDE 600; 310; 30; 20 MG/100ML; MG/100ML; MG/100ML; MG/100ML
50 INJECTION, SOLUTION INTRAVENOUS CONTINUOUS
Status: SHIPPED | OUTPATIENT
Start: 2025-04-09 | End: 2025-04-09

## 2025-04-09 RX ORDER — ONDANSETRON HYDROCHLORIDE 2 MG/ML
INJECTION, SOLUTION INTRAVENOUS AS NEEDED
Status: DISCONTINUED | OUTPATIENT
Start: 2025-04-09 | End: 2025-04-09

## 2025-04-09 RX ORDER — LIDOCAINE HYDROCHLORIDE 10 MG/ML
0.1 INJECTION, SOLUTION EPIDURAL; INFILTRATION; INTRACAUDAL; PERINEURAL ONCE
Status: DISCONTINUED | OUTPATIENT
Start: 2025-04-09 | End: 2025-04-10 | Stop reason: HOSPADM

## 2025-04-09 RX ADMIN — SODIUM CHLORIDE, POTASSIUM CHLORIDE, SODIUM LACTATE AND CALCIUM CHLORIDE: 600; 310; 30; 20 INJECTION, SOLUTION INTRAVENOUS at 12:52

## 2025-04-09 RX ADMIN — ONDANSETRON 4 MG: 2 INJECTION, SOLUTION INTRAMUSCULAR; INTRAVENOUS at 13:04

## 2025-04-09 RX ADMIN — GLYCOPYRROLATE 0.1 MG: 0.2 INJECTION, SOLUTION INTRAMUSCULAR; INTRAVENOUS at 13:04

## 2025-04-09 RX ADMIN — PROPOFOL 70 MG: 10 INJECTION, EMULSION INTRAVENOUS at 13:04

## 2025-04-09 RX ADMIN — LIDOCAINE HYDROCHLORIDE 50 MG: 20 INJECTION, SOLUTION EPIDURAL; INFILTRATION; INTRACAUDAL; PERINEURAL at 13:04

## 2025-04-09 RX ADMIN — PROPOFOL 50 MG: 10 INJECTION, EMULSION INTRAVENOUS at 13:07

## 2025-04-09 RX ADMIN — PROPOFOL 40 MG: 10 INJECTION, EMULSION INTRAVENOUS at 13:09

## 2025-04-09 SDOH — HEALTH STABILITY: MENTAL HEALTH: CURRENT SMOKER: 0

## 2025-04-09 ASSESSMENT — ENCOUNTER SYMPTOMS
DIFFICULTY URINATING: 0
COLOR CHANGE: 0
COUGH: 0
CHILLS: 0
UNEXPECTED WEIGHT CHANGE: 0
SLEEP DISTURBANCE: 0
CONSTIPATION: 0
ARTHRALGIAS: 0
FEVER: 0
TROUBLE SWALLOWING: 0
CONFUSION: 0
SPEECH DIFFICULTY: 0
LIGHT-HEADEDNESS: 0
SHORTNESS OF BREATH: 0
NAUSEA: 0
WHEEZING: 0
BLOOD IN STOOL: 0
DIZZINESS: 0
VOMITING: 0
JOINT SWELLING: 0
ABDOMINAL PAIN: 0
HEADACHES: 0
ABDOMINAL DISTENTION: 0
DIARRHEA: 0

## 2025-04-09 ASSESSMENT — PAIN SCALES - GENERAL
PAINLEVEL_OUTOF10: 5 - MODERATE PAIN
PAINLEVEL_OUTOF10: 0 - NO PAIN

## 2025-04-09 ASSESSMENT — PAIN - FUNCTIONAL ASSESSMENT
PAIN_FUNCTIONAL_ASSESSMENT: 0-10

## 2025-04-09 ASSESSMENT — COLUMBIA-SUICIDE SEVERITY RATING SCALE - C-SSRS
2. HAVE YOU ACTUALLY HAD ANY THOUGHTS OF KILLING YOURSELF?: NO
1. IN THE PAST MONTH, HAVE YOU WISHED YOU WERE DEAD OR WISHED YOU COULD GO TO SLEEP AND NOT WAKE UP?: NO
6. HAVE YOU EVER DONE ANYTHING, STARTED TO DO ANYTHING, OR PREPARED TO DO ANYTHING TO END YOUR LIFE?: NO

## 2025-04-09 ASSESSMENT — PAIN DESCRIPTION - DESCRIPTORS: DESCRIPTORS: PRESSURE

## 2025-04-09 NOTE — DISCHARGE INSTRUCTIONS
During the first 24 hours after your procedure, you should:    - Resume normal diet, unless otherwise directed by your doctor.  - Resume your home medications, unless otherwise directed by your doctor.  - Refrain from driving or operative heavy machinery.  - Drink plenty of liquids.  - Avoid consuming alcohol.  - Avoid strenuous activity or heavy lifting.    After 24 hours, you can resume regular activity.    Call your doctor office immediately ([phone]) or come to the nearest emergency room if you experience:    - Abdominal tenderness  - Blood in your stool or vomit  - Difficulty urinating or passing stools  - Difficulty breathing  - Chest pain  - Fever       If you experience any problems or have any questions following discharge from the GI Lab, please call:   Dr. Fernandez [phone].   To reach your physician after hours call [phone] and ask for the GI physician on call.

## 2025-04-09 NOTE — H&P
History Of Present Illness  Jia Yee is a 54 y.o. female presenting with EGD.     Past Medical History  Past Medical History:   Diagnosis Date    Encounter for screening for malignant neoplasm of colon 10/07/2022    Colon cancer screening    Endometriosis of the uterus, unspecified     Uterus, adenomyosis    Other abnormal and inconclusive findings on diagnostic imaging of breast     Abnormal mammogram    Palpitations 2017    Palpitations       Surgical History  Past Surgical History:   Procedure Laterality Date     SECTION, CLASSIC  10/31/2014     Section    DILATION AND CURETTAGE OF UTERUS  10/31/2014    Dilation And Curettage    HYSTEROSCOPY  10/31/2014    Hysteroscopy With Endometrial Ablation    INCISIONAL BREAST BIOPSY  2017    Incisional Breast Biopsy        Social History  She reports that she quit smoking about 27 years ago. Her smoking use included cigarettes. She has never used smokeless tobacco. She reports current alcohol use of about 3.0 standard drinks of alcohol per week. She reports that she does not currently use drugs.    Family History  Family History   Problem Relation Name Age of Onset    Esophageal cancer Mother      Diabetes Father      Heart failure Father      Anemia Father          Allergies  House dust mite, Ragweed pollen, and Sulfa (sulfonamide antibiotics)    Review of Systems   Constitutional:  Negative for chills, fever and unexpected weight change.   HENT:  Negative for congestion and trouble swallowing.    Respiratory:  Negative for cough, shortness of breath and wheezing.    Cardiovascular:  Negative for chest pain.   Gastrointestinal:  Negative for abdominal distention, abdominal pain, blood in stool, constipation, diarrhea, nausea and vomiting.   Genitourinary:  Negative for difficulty urinating.   Musculoskeletal:  Negative for arthralgias and joint swelling.   Skin:  Negative for color change.   Neurological:  Negative for dizziness, speech  difficulty, light-headedness and headaches.   Psychiatric/Behavioral:  Negative for confusion and sleep disturbance.         Physical Exam  Constitutional:       General: She is awake.      Appearance: Normal appearance.   HENT:      Head: Normocephalic and atraumatic.      Nose: Nose normal.      Mouth/Throat:      Mouth: Mucous membranes are moist.   Eyes:      Pupils: Pupils are equal, round, and reactive to light.   Neck:      Thyroid: No thyroid mass.      Trachea: Phonation normal.   Cardiovascular:      Rate and Rhythm: Normal rate and regular rhythm.   Pulmonary:      Effort: Pulmonary effort is normal. No respiratory distress.      Breath sounds: Normal air entry. No decreased breath sounds, wheezing, rhonchi or rales.   Abdominal:      General: Bowel sounds are normal. There is no distension.      Palpations: Abdomen is soft.      Tenderness: There is no abdominal tenderness.   Musculoskeletal:      Cervical back: Neck supple.      Right lower leg: No edema.      Left lower leg: No edema.   Skin:     General: Skin is warm.      Capillary Refill: Capillary refill takes less than 2 seconds.   Neurological:      General: No focal deficit present.      Mental Status: She is alert and oriented to person, place, and time. Mental status is at baseline.      Cranial Nerves: Cranial nerves 2-12 are intact.      Motor: Motor function is intact.   Psychiatric:         Attention and Perception: Attention and perception normal.         Mood and Affect: Mood normal.         Speech: Speech normal.         Behavior: Behavior normal.          Last Recorded Vitals  There were no vitals taken for this visit.    Relevant Results             Assessment/Plan   Assessment & Plan  Globus sensation    Family history of esophageal cancer             PROCEED  Benito Fernandez MD

## 2025-04-09 NOTE — ANESTHESIA POSTPROCEDURE EVALUATION
Patient: Jia Yee    Procedure Summary       Date: 04/09/25 Room / Location: [facility] OR    Anesthesia Start: 1259 Anesthesia Stop: 1318    Procedure: EGD Diagnosis:       Globus sensation      Family history of esophageal cancer    Scheduled Providers: Benito Fernandez MD Responsible Provider: Anjum Toney MD    Anesthesia Type: MAC ASA Status: 2            Anesthesia Type: MAC    Vitals Value Taken Time   /65 04/09/25 1332   Temp 36.6 °C (97.9 °F) 04/09/25 1317   Pulse 77 04/09/25 1332   Resp 16 04/09/25 1332   SpO2 100 % 04/09/25 1332       Anesthesia Post Evaluation    Patient location during evaluation: bedside  Patient participation: complete - patient participated  Level of consciousness: awake  Pain management: adequate  Airway patency: patent  Cardiovascular status: acceptable  Respiratory status: acceptable  Hydration status: stable  Postoperative Nausea and Vomiting: none  Comments: Did well    No notable events documented.

## 2025-04-09 NOTE — ANESTHESIA PREPROCEDURE EVALUATION
Patient: Jia Yee    Procedure Information       Date/Time: 04/09/25 1220    Scheduled providers: Benito Fernandez MD    Procedure: EGD    Location: [facility] OR            Relevant Problems   No relevant active problems       Clinical information reviewed: no acute illnesses    Tobacco  Allergies  Meds   Med Hx  Surg Hx  OB Status  Fam Hx  Soc   Hx        NPO Detail:  NPO/Void Status  Date of Last Liquid: 04/09/25  Time of Last Liquid: 0600  Date of Last Solid: 04/08/25  Time of Last Solid: 1800  Last Intake Type: Clear fluids; Light meal  Time of Last Void: 1135         Physical Exam    Airway  Mallampati: II  TM distance: >3 FB  Neck ROM: full     Cardiovascular - normal exam  Rate: normal     Dental - normal exam     Pulmonary - normal exam  Breath sounds clear to auscultation     Abdominal            Anesthesia Plan    History of general anesthesia?: yes  History of complications of general anesthesia?: no    ASA 2     MAC     The patient is not a current smoker.    intravenous induction   Anesthetic plan and risks discussed with patient.    Plan discussed with CRNA and CAA.

## 2025-04-10 ASSESSMENT — PAIN SCALES - GENERAL: PAINLEVEL_OUTOF10: 0 - NO PAIN

## 2025-05-01 ENCOUNTER — OFFICE VISIT (OUTPATIENT)
Dept: ORTHOPEDIC SURGERY | Facility: CLINIC | Age: 55
End: 2025-05-01
Payer: COMMERCIAL

## 2025-05-01 DIAGNOSIS — M19.042 ARTHRITIS OF LEFT HAND: Primary | ICD-10-CM

## 2025-05-01 DIAGNOSIS — M19.049 OSTEOARTHRITIS OF FINGER, UNSPECIFIED LATERALITY: ICD-10-CM

## 2025-05-01 PROCEDURE — 99212 OFFICE O/P EST SF 10 MIN: CPT | Mod: 25 | Performed by: ORTHOPAEDIC SURGERY

## 2025-05-01 PROCEDURE — 20600 DRAIN/INJ JOINT/BURSA W/O US: CPT | Mod: LT | Performed by: ORTHOPAEDIC SURGERY

## 2025-05-01 PROCEDURE — 99214 OFFICE O/P EST MOD 30 MIN: CPT | Performed by: ORTHOPAEDIC SURGERY

## 2025-05-01 PROCEDURE — 20600 DRAIN/INJ JOINT/BURSA W/O US: CPT | Performed by: ORTHOPAEDIC SURGERY

## 2025-05-01 PROCEDURE — 2500000004 HC RX 250 GENERAL PHARMACY W/ HCPCS (ALT 636 FOR OP/ED): Performed by: ORTHOPAEDIC SURGERY

## 2025-05-01 RX ORDER — LIDOCAINE HYDROCHLORIDE 10 MG/ML
0.5 INJECTION, SOLUTION INFILTRATION; PERINEURAL
Status: COMPLETED | OUTPATIENT
Start: 2025-05-01 | End: 2025-05-01

## 2025-05-01 RX ADMIN — LIDOCAINE HYDROCHLORIDE 0.5 ML: 10 INJECTION, SOLUTION INFILTRATION; PERINEURAL at 11:50

## 2025-05-01 RX ADMIN — TRIAMCINOLONE ACETONIDE 5 MG: 10 INJECTION, SUSPENSION INTRA-ARTICULAR; INTRALESIONAL at 11:50

## 2025-05-01 NOTE — PROGRESS NOTES
History present illness: Patient presents today for evaluation of the left ring and small finger distal interphalangeal joint arthritic change.  Previous steroid injections helped but now symptoms have returned.        Physical examination:  General: Alert and oriented to person, place, and time.  No acute distress and breathing comfortably: Pleasant and cooperative with examination.  Extremities: Evaluation of the left upper extremity finds the patient had palpable radial artery at the wrist with brisk capillary refill to all digits.  Patient has intact sensation to axillary radial median and ulnar nerves.  There are no open wounds.  There are no signs of infection.  There is no evidence of lymphedema or lymphatic streaking.  The patient has supple compartments to right arm forearm and hand.  Focal tenderness and swelling to distal interphalangeal joints of left ring and small fingers.      Diagnostic studies:      Assessment: Left ring and small finger distal interphalangeal joint arthritic change more symptomatic to small finger than ring      Plan: Treatment options were again discussed including both operative and nonoperative strategies.  We talked about the concept of fusion along with intraoperative techniques and postoperative protocols.  Patient elects for steroid injection to the symptomatic joints today and for a 3-month follow-up.  She is heavily considering fusion to the small finger distal interphalangeal joint at some point in the future.  Possibly as early as fall.      Procedure:        Procedure:  Hand / UE Inj/Asp: L ring DIP for osteoarthritis on 5/1/2025 11:50 AM  Indications: pain  Details: 25 G needle, dorsal approach  Medications: 5 mg triamcinolone acetonide 10 mg/mL; 0.5 mL lidocaine 10 mg/mL (1 %)  Outcome: tolerated well, no immediate complications  Procedure, treatment alternatives, risks and benefits explained, specific risks discussed. Consent was given by the patient. Immediately  prior to procedure a time out was called to verify the correct patient, procedure, equipment, support staff and site/side marked as required. Patient was prepped and draped in the usual sterile fashion.       Hand / UE Inj/Asp: L small DIP for osteoarthritis on 5/1/2025 11:50 AM  Indications: pain  Details: 25 G needle, dorsal approach  Medications: 5 mg triamcinolone acetonide 10 mg/mL; 0.5 mL lidocaine 10 mg/mL (1 %)  Outcome: tolerated well, no immediate complications  Procedure, treatment alternatives, risks and benefits explained, specific risks discussed. Consent was given by the patient. Immediately prior to procedure a time out was called to verify the correct patient, procedure, equipment, support staff and site/side marked as required. Patient was prepped and draped in the usual sterile fashion.

## 2025-07-08 ENCOUNTER — APPOINTMENT (OUTPATIENT)
Dept: GASTROENTEROLOGY | Facility: CLINIC | Age: 55
End: 2025-07-08
Payer: COMMERCIAL

## 2025-07-08 ENCOUNTER — TELEPHONE (OUTPATIENT)
Dept: PRIMARY CARE | Facility: CLINIC | Age: 55
End: 2025-07-08

## 2025-07-08 DIAGNOSIS — Z91.89 AT RISK FOR INFECTIOUS DISEASE DUE TO RECENT FOREIGN TRAVEL: Primary | ICD-10-CM

## 2025-07-08 NOTE — TELEPHONE ENCOUNTER
Patient is traveling to [place] she was asking if she needed any vaccines like malaria or any antibiotics that she may need incase she gets sick with water etc.?

## 2025-07-18 DIAGNOSIS — Z29.89 NEED FOR MALARIA PROPHYLAXIS: ICD-10-CM

## 2025-07-18 DIAGNOSIS — Z29.89 ALTITUDE SICKNESS PREVENTATIVE MEASURES: Primary | ICD-10-CM

## 2025-07-18 DIAGNOSIS — K52.9 ACUTE GASTROENTERITIS: Primary | ICD-10-CM

## 2025-07-18 RX ORDER — MEFLOQUINE HYDROCHLORIDE 250 MG/1
250 TABLET ORAL WEEKLY
Qty: 6 TABLET | Refills: 0 | Status: SHIPPED | OUTPATIENT
Start: 2025-07-18 | End: 2025-08-29

## 2025-07-18 RX ORDER — CIPROFLOXACIN 500 MG/1
500 TABLET, FILM COATED ORAL 2 TIMES DAILY
Qty: 14 TABLET | Refills: 0 | Status: SHIPPED | OUTPATIENT
Start: 2025-07-18 | End: 2025-07-25

## 2025-07-18 RX ORDER — ACETAZOLAMIDE 250 MG/1
250 TABLET ORAL DAILY
Qty: 9 TABLET | Refills: 0 | Status: SHIPPED | OUTPATIENT
Start: 2025-07-18 | End: 2025-07-27

## 2025-08-12 ENCOUNTER — APPOINTMENT (OUTPATIENT)
Dept: ORTHOPEDIC SURGERY | Facility: CLINIC | Age: 55
End: 2025-08-12
Payer: COMMERCIAL

## 2025-08-15 ENCOUNTER — APPOINTMENT (OUTPATIENT)
Dept: PRIMARY CARE | Facility: CLINIC | Age: 55
End: 2025-08-15
Payer: COMMERCIAL

## 2025-08-15 VITALS
DIASTOLIC BLOOD PRESSURE: 74 MMHG | HEIGHT: 63 IN | SYSTOLIC BLOOD PRESSURE: 118 MMHG | TEMPERATURE: 97.3 F | BODY MASS INDEX: 26.58 KG/M2 | WEIGHT: 150 LBS

## 2025-08-15 DIAGNOSIS — Z00.00 GENERAL MEDICAL EXAM: Primary | ICD-10-CM

## 2025-08-15 PROCEDURE — 3008F BODY MASS INDEX DOCD: CPT | Performed by: FAMILY MEDICINE

## 2025-08-15 PROCEDURE — 99396 PREV VISIT EST AGE 40-64: CPT | Performed by: FAMILY MEDICINE

## 2025-08-15 ASSESSMENT — ENCOUNTER SYMPTOMS
HEMATURIA: 0
ARTHRALGIAS: 0
SORE THROAT: 0
PALPITATIONS: 0
FATIGUE: 0
NUMBNESS: 0
FEVER: 0
MYALGIAS: 0
DYSURIA: 0
RHINORRHEA: 0
HEADACHES: 0
SLEEP DISTURBANCE: 0
WOUND: 0
BACK PAIN: 0
WHEEZING: 0
ADENOPATHY: 0
DIARRHEA: 0
BLOOD IN STOOL: 0
DIZZINESS: 0
CHILLS: 0
NAUSEA: 0
VOICE CHANGE: 0
ABDOMINAL PAIN: 0
VOMITING: 0
SINUS PRESSURE: 0
COUGH: 0
SHORTNESS OF BREATH: 0
DYSPHORIC MOOD: 0
NERVOUS/ANXIOUS: 0
FREQUENCY: 0
WEAKNESS: 0
CONSTIPATION: 0

## 2025-08-15 ASSESSMENT — PATIENT HEALTH QUESTIONNAIRE - PHQ9
2. FEELING DOWN, DEPRESSED OR HOPELESS: NOT AT ALL
SUM OF ALL RESPONSES TO PHQ9 QUESTIONS 1 AND 2: 0
1. LITTLE INTEREST OR PLEASURE IN DOING THINGS: NOT AT ALL

## 2026-03-02 ENCOUNTER — APPOINTMENT (OUTPATIENT)
Dept: OTOLARYNGOLOGY | Facility: CLINIC | Age: 56
End: 2026-03-02
Payer: COMMERCIAL